# Patient Record
Sex: FEMALE | URBAN - METROPOLITAN AREA
[De-identification: names, ages, dates, MRNs, and addresses within clinical notes are randomized per-mention and may not be internally consistent; named-entity substitution may affect disease eponyms.]

---

## 2020-07-16 ENCOUNTER — OFFICE VISIT (OUTPATIENT)
Dept: URBAN - METROPOLITAN AREA CLINIC 40 | Facility: CLINIC | Age: 75
End: 2020-07-16
Payer: MEDICARE

## 2020-07-16 DIAGNOSIS — H25.13 AGE-RELATED NUCLEAR CATARACT, BILATERAL: Primary | ICD-10-CM

## 2020-07-16 PROCEDURE — 99204 OFFICE O/P NEW MOD 45 MIN: CPT | Performed by: OPHTHALMOLOGY

## 2020-07-16 ASSESSMENT — VISUAL ACUITY
OD: 20/25
OS: 20/25

## 2020-07-16 ASSESSMENT — KERATOMETRY
OS: 45.13
OD: 45.25

## 2020-07-16 ASSESSMENT — INTRAOCULAR PRESSURE
OS: 18
OD: 18

## 2020-07-16 NOTE — IMPRESSION/PLAN
Impression: Age-related nuclear cataract, bilateral: H25.13. Ronnierhina Cyndy Visually significant, quality of life issue, could improve with surgery. Plan: Cataracts account for the patient's complaints. Discussed all risks, benefits, alternateves, procedures and recovery. Patient understands changing glasses will not improve vision. Patient desires to have surgery, recommend phacoemulsification with intraocular lens implant OD.  lvl 2 - pt considering PanOptix. -  Re-evaluate OS for possible cataract surgery after OD is done.

## 2020-07-23 ENCOUNTER — PRE-OPERATIVE VISIT (OUTPATIENT)
Dept: URBAN - METROPOLITAN AREA CLINIC 23 | Facility: CLINIC | Age: 75
End: 2020-07-23
Payer: MEDICARE

## 2020-07-23 PROCEDURE — 92136 OPHTHALMIC BIOMETRY: CPT | Performed by: OPHTHALMOLOGY

## 2020-07-23 ASSESSMENT — PACHYMETRY
OS: 21.00
OD: 2.52
OS: 2.52
OD: 21.52

## 2020-08-31 ENCOUNTER — SURGERY (OUTPATIENT)
Dept: URBAN - METROPOLITAN AREA SURGERY 11 | Facility: SURGERY | Age: 75
End: 2020-08-31
Payer: MEDICARE

## 2020-08-31 PROCEDURE — 66984 XCAPSL CTRC RMVL W/O ECP: CPT | Performed by: OPHTHALMOLOGY

## 2020-09-01 ENCOUNTER — POST-OPERATIVE VISIT (OUTPATIENT)
Dept: URBAN - METROPOLITAN AREA CLINIC 40 | Facility: CLINIC | Age: 75
End: 2020-09-01
Payer: MEDICARE

## 2020-09-01 DIAGNOSIS — Z48.810 ENCOUNTER FOR SURGICAL AFTERCARE FOLLOWING SURGERY ON A SENSE ORGAN: Primary | ICD-10-CM

## 2020-09-01 ASSESSMENT — INTRAOCULAR PRESSURE
OS: 18
OD: 18

## 2020-09-01 NOTE — IMPRESSION/PLAN
Impression: S/P CE/Toric IOL TFAT40  +27.0 OD - 1 Day. Encounter for surgical aftercare following surgery on a sense organ  Z48.810. Excellent post op course   Post operative instructions reviewed - Plan: Continued drops in operated eye.  --Continue all meds

## 2020-09-08 ENCOUNTER — POST-OPERATIVE VISIT (OUTPATIENT)
Dept: URBAN - METROPOLITAN AREA CLINIC 40 | Facility: CLINIC | Age: 75
End: 2020-09-08
Payer: MEDICARE

## 2020-09-08 ASSESSMENT — VISUAL ACUITY
OD: 20/30
OS: 20/25

## 2020-09-08 ASSESSMENT — INTRAOCULAR PRESSURE
OS: 18
OD: 16

## 2020-09-08 NOTE — IMPRESSION/PLAN
Impression: S/P CE/PanOptix Toric IOL TFAT40  +27.0 OD - 8 Days. Encounter for surgical aftercare following surgery on a sense organ  Z48.810. Plan: Schedule cataract surgery on other eye.  --Taper all meds as directed

## 2020-09-21 ENCOUNTER — SURGERY (OUTPATIENT)
Dept: URBAN - METROPOLITAN AREA SURGERY 11 | Facility: SURGERY | Age: 75
End: 2020-09-21
Payer: MEDICARE

## 2020-09-21 PROCEDURE — 66984 XCAPSL CTRC RMVL W/O ECP: CPT | Performed by: OPHTHALMOLOGY

## 2020-09-22 ENCOUNTER — POST-OPERATIVE VISIT (OUTPATIENT)
Dept: URBAN - METROPOLITAN AREA CLINIC 40 | Facility: CLINIC | Age: 75
End: 2020-09-22
Payer: MEDICARE

## 2020-09-22 DIAGNOSIS — Z96.1 PRESENCE OF INTRAOCULAR LENS: Primary | ICD-10-CM

## 2020-09-22 ASSESSMENT — INTRAOCULAR PRESSURE
OS: 16
OD: 16

## 2020-09-30 ENCOUNTER — POST-OPERATIVE VISIT (OUTPATIENT)
Dept: URBAN - METROPOLITAN AREA CLINIC 40 | Facility: CLINIC | Age: 75
End: 2020-09-30
Payer: MEDICARE

## 2020-09-30 ASSESSMENT — INTRAOCULAR PRESSURE
OD: 15
OS: 15

## 2020-09-30 ASSESSMENT — VISUAL ACUITY
OS: 20/40
OD: 20/25

## 2020-09-30 NOTE — IMPRESSION/PLAN
Impression: S/P CE/[employer] - PanOptix Toric IOL  TFAT30  +29.50 OS - 9 Days. Presence of intraocular lens  Z96.1. Excellent post op course   Post operative instructions reviewed - Plan: Taper drops in operated eye as directed.  --Taper all meds as directed

## 2020-10-23 ENCOUNTER — OFFICE VISIT (OUTPATIENT)
Dept: URBAN - METROPOLITAN AREA CLINIC 40 | Facility: CLINIC | Age: 75
End: 2020-10-23
Payer: COMMERCIAL

## 2020-10-23 DIAGNOSIS — H52.4 PRESBYOPIA: Primary | ICD-10-CM

## 2020-10-23 PROCEDURE — 92004 COMPRE OPH EXAM NEW PT 1/>: CPT | Performed by: OPTOMETRIST

## 2020-10-23 ASSESSMENT — VISUAL ACUITY
OD: 20/20
OS: 20/20

## 2022-02-10 ENCOUNTER — OFFICE VISIT (OUTPATIENT)
Dept: URBAN - METROPOLITAN AREA CLINIC 40 | Facility: CLINIC | Age: 77
End: 2022-02-10
Payer: COMMERCIAL

## 2022-02-10 PROCEDURE — 92014 COMPRE OPH EXAM EST PT 1/>: CPT | Performed by: OPTOMETRIST

## 2022-02-10 ASSESSMENT — INTRAOCULAR PRESSURE
OD: 15
OS: 16

## 2022-02-10 ASSESSMENT — VISUAL ACUITY
OS: 20/30
OD: 20/30

## 2022-02-15 ENCOUNTER — OFFICE VISIT (OUTPATIENT)
Dept: URBAN - METROPOLITAN AREA CLINIC 40 | Facility: CLINIC | Age: 77
End: 2022-02-15
Payer: MEDICARE

## 2022-02-15 DIAGNOSIS — H26.493 OTHER SECONDARY CATARACT, BILATERAL: Primary | ICD-10-CM

## 2022-02-15 PROCEDURE — 99214 OFFICE O/P EST MOD 30 MIN: CPT | Performed by: OPHTHALMOLOGY

## 2022-02-15 ASSESSMENT — VISUAL ACUITY
OD: 20/30
OS: 20/30

## 2022-02-15 ASSESSMENT — KERATOMETRY
OS: 45.13
OD: 44.75

## 2022-02-15 NOTE — IMPRESSION/PLAN
Impression: Other secondary cataract, bilateral: H26.493. Mark Sheerer Visually significant, quality of life issue, could improve with surgery. Plan: Discussed all risks, benefits, alternatives, procedures and recovery. Patient understands changing glasses will not improve vision. Patient desires to have surgery, recommend yag capsulotomy OD.

## 2022-04-18 ENCOUNTER — SURGERY (OUTPATIENT)
Dept: URBAN - METROPOLITAN AREA SURGERY 11 | Facility: SURGERY | Age: 77
End: 2022-04-18
Payer: MEDICARE

## 2022-04-18 PROCEDURE — 66821 AFTER CATARACT LASER SURGERY: CPT | Performed by: OPHTHALMOLOGY

## 2022-04-27 ENCOUNTER — POST-OPERATIVE VISIT (OUTPATIENT)
Dept: URBAN - METROPOLITAN AREA CLINIC 40 | Facility: CLINIC | Age: 77
End: 2022-04-27
Payer: MEDICARE

## 2022-04-27 DIAGNOSIS — Z48.810 ENCOUNTER FOR SURGICAL AFTERCARE FOLLOWING SURGERY ON A SENSE ORGAN: Primary | ICD-10-CM

## 2022-04-27 PROCEDURE — 99024 POSTOP FOLLOW-UP VISIT: CPT | Performed by: OPTOMETRIST

## 2022-04-27 ASSESSMENT — INTRAOCULAR PRESSURE
OD: 15
OS: 15

## 2022-04-27 NOTE — IMPRESSION/PLAN
Impression: S/P YAG Capsulotomy (Yttrium Aluminum [place]) OD - 9 Days. Encounter for surgical aftercare following surgery on a sense organ  Z48.810. Excellent post op course Plan: Schedule YAG on other eye. --Advised patient to use artificial tears for comfort.

## 2022-05-02 ENCOUNTER — SURGERY (OUTPATIENT)
Dept: URBAN - METROPOLITAN AREA SURGERY 11 | Facility: SURGERY | Age: 77
End: 2022-05-02
Payer: MEDICARE

## 2022-05-02 PROCEDURE — 66821 AFTER CATARACT LASER SURGERY: CPT | Performed by: OPHTHALMOLOGY

## 2022-05-12 ENCOUNTER — POST-OPERATIVE VISIT (OUTPATIENT)
Dept: URBAN - METROPOLITAN AREA CLINIC 40 | Facility: CLINIC | Age: 77
End: 2022-05-12
Payer: MEDICARE

## 2022-05-12 PROCEDURE — 99024 POSTOP FOLLOW-UP VISIT: CPT | Performed by: OPTOMETRIST

## 2022-05-12 ASSESSMENT — INTRAOCULAR PRESSURE
OD: 15
OS: 16

## 2022-05-12 NOTE — IMPRESSION/PLAN
Impression: S/P YAG Capsulotomy (Yttrium Aluminum [place]) OS - 10 Days. Encounter for surgical aftercare following surgery on a sense organ  Z48.810. Excellent post op course Plan: Instructions given. Use artificial tears as needed. --Advised patient to use artificial tears for comfort.

## 2023-02-07 ENCOUNTER — OFFICE VISIT (OUTPATIENT)
Dept: URBAN - METROPOLITAN AREA CLINIC 40 | Facility: CLINIC | Age: 78
End: 2023-02-07
Payer: COMMERCIAL

## 2023-02-07 DIAGNOSIS — H52.4 PRESBYOPIA: Primary | ICD-10-CM

## 2023-02-07 PROCEDURE — 92014 COMPRE OPH EXAM EST PT 1/>: CPT | Performed by: OPTOMETRIST

## 2023-02-07 ASSESSMENT — VISUAL ACUITY
OD: 20/25
OS: 20/25

## 2023-02-07 ASSESSMENT — INTRAOCULAR PRESSURE
OS: 15
OD: 15